# Patient Record
Sex: MALE | Race: WHITE | NOT HISPANIC OR LATINO | URBAN - METROPOLITAN AREA
[De-identification: names, ages, dates, MRNs, and addresses within clinical notes are randomized per-mention and may not be internally consistent; named-entity substitution may affect disease eponyms.]

---
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
## 2019-01-29 ENCOUNTER — APPOINTMENT (OUTPATIENT)
Dept: MEDICAL GROUP | Facility: PHYSICIAN GROUP | Age: 19
End: 2019-01-29
Payer: COMMERCIAL

## 2024-04-22 ENCOUNTER — OFFICE VISIT (OUTPATIENT)
Dept: URGENT CARE | Facility: CLINIC | Age: 24
End: 2024-04-22

## 2024-04-22 ENCOUNTER — APPOINTMENT (OUTPATIENT)
Dept: URGENT CARE | Facility: CLINIC | Age: 24
End: 2024-04-22

## 2024-04-22 VITALS
SYSTOLIC BLOOD PRESSURE: 104 MMHG | BODY MASS INDEX: 24.3 KG/M2 | HEART RATE: 59 BPM | HEIGHT: 71 IN | TEMPERATURE: 98.2 F | RESPIRATION RATE: 20 BRPM | DIASTOLIC BLOOD PRESSURE: 58 MMHG | WEIGHT: 173.6 LBS | OXYGEN SATURATION: 98 %

## 2024-04-22 DIAGNOSIS — B96.89 BACTERIAL URI: ICD-10-CM

## 2024-04-22 DIAGNOSIS — R05.1 ACUTE COUGH: Primary | ICD-10-CM

## 2024-04-22 DIAGNOSIS — B08.4 HAND, FOOT AND MOUTH DISEASE: ICD-10-CM

## 2024-04-22 DIAGNOSIS — J06.9 BACTERIAL URI: ICD-10-CM

## 2024-04-22 DIAGNOSIS — B96.89 ACUTE BACTERIAL SINUSITIS: ICD-10-CM

## 2024-04-22 DIAGNOSIS — J01.90 ACUTE BACTERIAL SINUSITIS: ICD-10-CM

## 2024-04-22 DIAGNOSIS — H10.9 BACTERIAL CONJUNCTIVITIS OF RIGHT EYE: ICD-10-CM

## 2024-04-22 PROCEDURE — 3074F SYST BP LT 130 MM HG: CPT

## 2024-04-22 PROCEDURE — 99203 OFFICE O/P NEW LOW 30 MIN: CPT

## 2024-04-22 PROCEDURE — 3078F DIAST BP <80 MM HG: CPT

## 2024-04-22 RX ORDER — DOXYCYCLINE HYCLATE 100 MG
100 TABLET ORAL 2 TIMES DAILY
Qty: 14 TABLET | Refills: 0 | Status: SHIPPED | OUTPATIENT
Start: 2024-04-22 | End: 2024-04-29

## 2024-04-22 RX ORDER — POLYMYXIN B SULFATE AND TRIMETHOPRIM 1; 10000 MG/ML; [USP'U]/ML
1 SOLUTION OPHTHALMIC EVERY 4 HOURS
Qty: 4 ML | Refills: 0 | Status: SHIPPED | OUTPATIENT
Start: 2024-04-22 | End: 2024-05-02

## 2024-04-22 RX ORDER — DEXTROMETHORPHAN HYDROBROMIDE AND PROMETHAZINE HYDROCHLORIDE 15; 6.25 MG/5ML; MG/5ML
5 SYRUP ORAL
Qty: 118 ML | Refills: 0 | Status: SHIPPED | OUTPATIENT
Start: 2024-04-22

## 2024-04-22 RX ORDER — BENZONATATE 100 MG/1
100 CAPSULE ORAL 3 TIMES DAILY PRN
Qty: 60 CAPSULE | Refills: 0 | Status: SHIPPED | OUTPATIENT
Start: 2024-04-22

## 2024-04-22 RX ORDER — DOXYCYCLINE HYCLATE 100 MG
100 TABLET ORAL 2 TIMES DAILY
Qty: 14 TABLET | Refills: 0 | Status: SHIPPED
Start: 2024-04-22 | End: 2024-04-22

## 2024-04-22 RX ORDER — POLYMYXIN B SULFATE AND TRIMETHOPRIM 1; 10000 MG/ML; [USP'U]/ML
1 SOLUTION OPHTHALMIC EVERY 4 HOURS
Qty: 4 ML | Refills: 0 | Status: SHIPPED
Start: 2024-04-22 | End: 2024-04-22

## 2024-04-22 ASSESSMENT — ENCOUNTER SYMPTOMS
PALPITATIONS: 0
EYE DISCHARGE: 1
WHEEZING: 0
CHILLS: 1
COUGH: 0
DIZZINESS: 0
HEMOPTYSIS: 0
DEPRESSION: 0
BLURRED VISION: 0
SHORTNESS OF BREATH: 0
SORE THROAT: 0
PHOTOPHOBIA: 0
HEADACHES: 1
FEVER: 0
SPUTUM PRODUCTION: 0
VOMITING: 0
SINUS PAIN: 1
HEARTBURN: 0
EYE PAIN: 0
MYALGIAS: 0
NAUSEA: 0
EYE REDNESS: 1
DOUBLE VISION: 0

## 2024-04-23 NOTE — PROGRESS NOTES
Subjective:   Rajan Raya is a 23 y.o. male who presents for Cough (X2weeks sore throat/nasal congestion/ hand red bumps/blister/right eye /red/drainage/fevers )          I introduced myself to the patient and informed them that I am a family nurse practitioner.    HPI:Rajan is a 23 year-old comes in today c/o who comes in today with c/o 1. sinus pain and pressure, thick green nasal drainage, tactile fever, headache, malaise. Onset was over a 2 weeks ago.  Patient describes symptoms as constant. They describe the pain as headache, aching and pressure in the area of the sinuses. Aggravating factors include leaning forward, blowing their nose. Relieving factors include none. Treatments tried at home include  Theraflu, Ibuprofen, Tylenol with poor result . They describe their symptoms as moderate.    2. who comes in today c/o right eye is  red, irritated, thick yellow mucopurulent discharge this morning and crusting of his eyelashes. Onset was woke up with it this morning.  Patient describes symptoms as constant. They describe the pain as none. Aggravating factors include rubbing the eye. Relieving factors include bathing the eye. Treatments tried at home include none. They describe their symptoms as moderate.  Denies anybody else at home has similar symptoms, denies any contact with anyone with pinkeye. He denies any visual changes, denies using contact lenses  3. Patient also states he did have bumps on his hands and feet since 2 weeks ago, these are now improving but still present.      Review of Systems   Constitutional:  Positive for chills and malaise/fatigue. Negative for fever.   HENT:  Positive for congestion and sinus pain. Negative for ear discharge, ear pain, hearing loss and sore throat.    Eyes:  Positive for discharge and redness. Negative for blurred vision, double vision, photophobia and pain.   Respiratory:  Negative for cough, hemoptysis, sputum production, shortness of breath and  "wheezing.    Cardiovascular:  Negative for chest pain and palpitations.   Gastrointestinal:  Negative for heartburn, nausea and vomiting.   Genitourinary:  Negative for dysuria.   Musculoskeletal:  Negative for myalgias.   Skin:  Negative for rash.   Neurological:  Positive for headaches. Negative for dizziness.   Psychiatric/Behavioral:  Negative for depression.    All other systems reviewed and are negative.      Medications: This patient does not have an active medication from one of the medication groupers.     Allergies: Amoxicillin    Problem List: does not have any pertinent problems on file.    Surgical History:  No past surgical history on file.    Past Social Hx:   reports that he has never smoked. He has never used smokeless tobacco. He reports that he does not drink alcohol and does not use drugs.     Past Family Hx:   family history includes No Known Problems in his brother, father, and mother.     Problem list, medications, and allergies reviewed by myself today in Epic.   I have documented what I find to be significant in regards to past medical, social, family and surgical history  in my HPI or under PMH/PSH/FH review section, otherwise it is noncontributory     Objective:     /58   Pulse (!) 59   Temp 36.8 °C (98.2 °F) (Temporal)   Resp 20   Ht 1.803 m (5' 11\")   Wt 78.7 kg (173 lb 9.6 oz)   SpO2 98%   BMI 24.21 kg/m²     During this visit, appropriate PPE was worn, and hand hygiene was performed.    Physical Exam  Vitals reviewed.   Constitutional:       General: He is not in acute distress.     Appearance: Normal appearance. He is not ill-appearing, toxic-appearing or diaphoretic.   HENT:      Head: Normocephalic and atraumatic.      Right Ear: Tympanic membrane, ear canal and external ear normal. There is no impacted cerumen.      Left Ear: Tympanic membrane, ear canal and external ear normal. There is no impacted cerumen.      Nose: Congestion and rhinorrhea present. Rhinorrhea is " purulent.      Right Turbinates: Swollen.      Left Turbinates: Swollen.      Right Sinus: Maxillary sinus tenderness and frontal sinus tenderness present.      Left Sinus: Maxillary sinus tenderness and frontal sinus tenderness present.      Mouth/Throat:      Mouth: Mucous membranes are moist.      Pharynx: No oropharyngeal exudate or posterior oropharyngeal erythema.   Eyes:      General: No scleral icterus.        Right eye: No discharge.         Left eye: No discharge.      Extraocular Movements: Extraocular movements intact.      Conjunctiva/sclera: Conjunctivae normal.      Pupils: Pupils are equal, round, and reactive to light.      Comments: R eye exam shows no penetrative injury or foreign object noted.  There is injection and erythema of the conjunctivae present which is confined to the conjunctival layer as evidenced by gently moving the conjunctival layer with a moistened Q-tip.  There are traces of mucopurulent drainage visible with some crusting of eye lashes.  No signs of uveitis, hyphema, proptosis, or chemosis.  EOM intact without pain, no periorbital swelling or cellulitis.  Visual acuity is grossly intact. CN II - IV and CN VI grossly intact.    Cardiovascular:      Rate and Rhythm: Normal rate and regular rhythm.      Heart sounds: Normal heart sounds.   Pulmonary:      Effort: Pulmonary effort is normal. No respiratory distress.      Breath sounds: Normal breath sounds. No stridor. No wheezing, rhonchi or rales.   Chest:      Chest wall: No tenderness.   Abdominal:      General: There is no distension.      Palpations: Abdomen is soft.   Genitourinary:     Comments: Deferred  Musculoskeletal:         General: Normal range of motion.      Cervical back: Normal range of motion. No rigidity or tenderness.   Lymphadenopathy:      Cervical: No cervical adenopathy.   Skin:     General: Skin is warm and dry.   Neurological:      General: No focal deficit present.      Mental Status: He is alert and  oriented to person, place, and time. Mental status is at baseline.      Cranial Nerves: No cranial nerve deficit.   Psychiatric:         Mood and Affect: Mood normal.         Behavior: Behavior normal.         Thought Content: Thought content normal.         Judgment: Judgment normal.         Assessment/Plan:     Diagnosis and associated orders:     1. Acute cough  benzonatate (TESSALON) 100 MG Cap    promethazine-dextromethorphan (PROMETHAZINE-DM) 6.25-15 MG/5ML syrup      2. Bacterial conjunctivitis of right eye  polymixin-trimethoprim (POLYTRIM) 02342-4.1 UNIT/ML-% Solution    DISCONTINUED: polymixin-trimethoprim (POLYTRIM) 28253-5.1 UNIT/ML-% Solution      3. Acute bacterial sinusitis  doxycycline (VIBRAMYCIN) 100 MG Tab    DISCONTINUED: doxycycline (VIBRAMYCIN) 100 MG Tab      4. Bacterial URI  doxycycline (VIBRAMYCIN) 100 MG Tab    DISCONTINUED: doxycycline (VIBRAMYCIN) 100 MG Tab      5. Hand, foot and mouth disease           Comments/MDM:     1. Bacterial conjunctivitis of right eye    Discussed with patient that conjunctivitis can be bacterial, viral or allergic, and treatment approach is different for bacterial versus viral or allergic.  Discussed that his presentation and symptoms are consistent with bacterial conjunctivitis and this is treated with antibiotic drops.   To ease discomfort, apply a cool, clean wash cloth to your eye for 10 to 20 minutes, 3 to 4 times a day.  Gently wipe away any drainage from the eye with a warm, wet washcloth or a cotton ball.  Wash your hands often with soap and use paper towels to dry.  Do not share towels or washcloths. This may spread the infection.  Change or wash your pillowcase every day.  You should not use eye make-up until the infection is gone.  Stop using contacts lenses. Ask your eye professional how to sterilize or replace them before using again. This depends on the type of contact lenses used.  Do not touch the edge of the eyelid with the eye drop bottle or  ointment tube when applying medications to the affected eye. This will stop you from spreading the infection to the other eye or to others.  Report any problems that may be related to the prescribed medicine should they develop.   - polymixin-trimethoprim (POLYTRIM) 92767-3.1 UNIT/ML-% Solution; Administer 1 Drop into both eyes every 4 hours for 10 days.  Dispense: 4 mL; Refill: 0    2. Acute bacterial sinusitis  Patient meets Infectious Disease Society of [place] (IDSA)  guidelines for ABRS given duration of symptoms, worsening severity, clinical history and physical exam   We discussed management of sinus infection including -  - supportive care measures such as drinking plenty of fluids, use a humidifier in room at night to keep mucous membranes moist, applying warm compresses to face and forehead may be useful in relieving sinus pain and pressure, using a sinus wash such as the NeilMed Neti bottle several times a day as needed, Flonase daily, OTC second-generation non-sedating antihistamine such as Zyrtec, Allegra, or Loratadine daily, alternate Tylenol with ibuprofen (unless contraindicated) for pain and fever.  OTC decongestant of choice. Follow manufacturers guidelines for dosing and safety precautions.   -We did discuss red flags and reasons to return to urgent care versus when to go to the ER.    Discussed DDx, management options (risks,benefits, and alternatives to planned treatment), natural progression.  Questions were encouraged and answered. Written information was provided and I did go over this with the patient in clinic today.  Instructed patient regarding red flags and to return to urgent care prn if new or worsening sx or if there is no improvement in condition prn.    Advised the patient to follow-up with the primary care physician for recheck, reevaluation, and consideration of further management.  I did instruct patient regarding medications prescribed, purpose, side effects, cautions.   Instructed patient to get a pharmacy consult when picking up any prescribed medications.  Strict ER precautions discussed for any mastoid pain, swelling of the face or around the eyes, visual disturbances, eye pain, chest pain, difficulty breathing, difficulty swallowing, wheezing, stridor, or drooling, fever that does not respond to ibuprofen and Tylenol, inability to tolerate fluids, dehydration, especially in the setting of fever, chills, nausea or vomiting, lethargy.     Patient states they have good understanding and they are agreeable with the plan of care.    - doxycycline (VIBRAMYCIN) 100 MG Tab; Take 1 Tablet by mouth 2 times a day for 7 days.  Dispense: 14 Tablet; Refill: 0    3. Bacterial URI  - doxycycline (VIBRAMYCIN) 100 MG Tab; Take 1 Tablet by mouth 2 times a day for 7 days.  Dispense: 14 Tablet; Refill: 0    4. Acute cough  Patient states cough is troublesome and is asking for something to suppress it, especially during the night when cough is keeping them awake.  Instruct them regarding Tessalon pearls, purpose, side effects, precautions, may take it during daytime, will not make you drowsy;  Instructed patient regarding Promethazine DM for nighttime to help with sleep and cough suppression, purpose, S/E, precautions including the sedating effects of promethazine. They state they understand, they feel that the benefits at this point will outweigh the risks, would like to go ahead with the prescription as they state they need to get some sleep.     - benzonatate (TESSALON) 100 MG Cap; Take 1 Capsule by mouth 3 times a day as needed for Cough.  Dispense: 60 Capsule; Refill: 0  - promethazine-dextromethorphan (PROMETHAZINE-DM) 6.25-15 MG/5ML syrup; Take 5 mL by mouth at bedtime as needed for Cough.  Dispense: 118 mL; Refill: 0    5. Hand, foot and mouth disease  Discussed with patient Dx,  DDx, management options (risks,benefits, and alternatives to planned treatment), natural progression.    Supportive care measures were discussed.   Questions were encouraged and answered. Written information was provided and I did go over this with the patient in clinic today.  Instructed patient regarding red flags and to return to urgent care prn if new or worsening sx or if there is no improvement in condition prn.    Advised the patient to follow-up with the primary care physician for recheck, reevaluation, and consideration of further management.  I did instruct patient regarding medications prescribed, purpose, side effects, precautions.  Instructed patient to get a pharmacy consult when picking up any prescribed medications.   Patient states they have good understanding and they are agreeable with the plan of care.            Pt is clinically stable at today's acute urgent care visit. Vital signs are normal and reassuring.  No acute distress noted. Appropriate for outpatient management at this time.        I personally reviewed prior external notes and test results pertinent to today's visit.  I have independently reviewed and interpreted all diagnostics ordered during this urgent care acute visit.        Please note that this dictation was created using voice recognition software. I have made a reasonable attempt to correct obvious errors, but I expect that there are errors of grammar and possibly content that I did not discover before finalizing the note.    This note was electronically signed by Khris CAMARENA, MARIE, DILSHAD, KAYLEY